# Patient Record
Sex: MALE | Race: WHITE | NOT HISPANIC OR LATINO | Employment: FULL TIME | URBAN - METROPOLITAN AREA
[De-identification: names, ages, dates, MRNs, and addresses within clinical notes are randomized per-mention and may not be internally consistent; named-entity substitution may affect disease eponyms.]

---

## 2017-04-19 ENCOUNTER — HOSPITAL ENCOUNTER (OUTPATIENT)
Dept: PHYSICAL THERAPY | Facility: HOSPITAL | Age: 37
Setting detail: THERAPIES SERIES
Discharge: HOME | End: 2017-04-19

## 2017-04-19 DIAGNOSIS — S93.325S LISFRANC DISLOCATION, LEFT, SEQUELA: Primary | ICD-10-CM

## 2017-04-19 PROCEDURE — 97161 PT EVAL LOW COMPLEX 20 MIN: CPT | Performed by: PHYSICAL THERAPIST

## 2017-04-19 NOTE — PROGRESS NOTES
"    Outpatient Physical Therapy Ortho Initial Evaluation   Jennifer Renee     Patient Name: Matheus Olsen  : 1980  MRN: [id number]  Today's Date: 2017      Visit Date: 2017    There is no problem list on file for this patient.       History reviewed. No pertinent past medical history.     History reviewed. No pertinent surgical history.    Visit Dx:     ICD-10-CM ICD-9-CM   1. Lisfranc dislocation, left, sequela S93.325S 905.6             Patient History       17 1300          History    Chief Complaint Difficulty Walking;Difficulty with daily activities;Pain  -SP      Type of Pain Ankle pain  -SP      Date Current Problem(s) Began 17  -SP      Brief Description of Current Complaint Jumped from 2nd floor building and fracture tibia, fibula and 3 toes on left side he was casted in ER in [place] and a week later he saw Dr. Wright.  Patient has been non weight bearing but has put some weight on left LE.   Patient reports that he does have aching and a \"shock\" type pain.   To return to MD in 2-3 weeks.  He is supposed to be wearing an aircast and weight bearing only to balance.  He has crutches but is not using them all the time.  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 rest  -SP      What clinical tests have you had for this problem? X-ray  -SP      Pain     Pain Location Ankle;Foot  -SP      Pain at Present 0  -SP      Pain at Best 0  -SP      Pain at Worst 7  -SP      Pain Frequency Intermittent  -SP      Pain Description Aching;Shooting  -SP      " What Performance Factors Make the Current Problem(s) WORSE? weight bearing; but can have pain even at rest  -SP      What Performance Factors Make the Current Problem(s) BETTER? rest  -SP      Tolerance Time- Standing 30 min  -SP      Tolerance Time- Walking 30 min  -SP      Is your sleep disturbed? Yes  -SP      Difficulties at work? currently unable to work   -SP      Difficulties with ADL's? increased time and difficulty with weight bearing for ADLs  -SP      Fall Risk Assessment    Any falls in the past year: Yes  -SP      Number of falls reported in the last 12 months 1  -SP      Daily Activities    Primary Language English  -SP      How does patient learn best? Listening  -SP      Teaching needs identified Home Exercise Program;Management of Condition  -SP      Patient is concerned about/has problems with Performing home management (household chores, shopping, care of dependents);Performing job responsibilities/community activities (work, school,;Standing;Walking  -SP      Does patient have problems with the following? None  -SP      Barriers to learning None  -SP      Pt Participated in POC and Goals Yes  -SP      Safety    Are you being hurt, hit, or frightened by anyone at home or in your life? No  -SP      Are you being neglected by a caregiver No  -SP        User Key  (r) = Recorded By, (t) = Taken By, (c) = Cosigned By    Initials Name Provider Type    SP Jasmin Naidu, PT Physical Therapist                PT Ortho       04/19/17 1300    Posture/Observations    Observations Incision healing;Edema;Muscle atrophy  -SP    Posture/Observations Comments medial and lateral ankle incisions and incision across dorsum of foot healing well  -SP    Sensation    Additional Comments patient with reports of numbness throughout left foot that varies in intensity; also exhibits hypersensitivity along medial ankle and dorsal ankle incisions  -SP    General LE Assessment    ROM RLE ROM was WFL   left hip and  knee AROM wfl  -SP    Left Ankle    Dorsiflexion AROM Deficit 2  -SP    Plantarflexion AROM Deficit 40  -SP    Inversion AROM Deficit 15  -SP    Eversion AROM Deficit 0  -SP    Left Ankle/Foot    Ankle PF Gross Movement (3-/5) fair minus  -SP    Ankle Dorsiflexion Gross Movement (3-/5) fair minus  -SP    Subtalar Inversion Gross Movement (2+/5) poor plus  -SP    Subtalar Eversion Gross Movement (2+/5) poor plus  -SP    Flexibility    Flexibility Tested? Lower Extremity  -SP    Lower Extremity Flexibility    Gastrocnemius Left:;Moderately limited  -SP    Soleus Left:;Moderately limited  -SP    Ankle Girth    Figure 8- Left 57.0 cm  -SP    Mid Tarsal- Left 25.4 cm  -SP    MTP Joints- Left 24.4 cm  -SP    Ankle Girth Other 1 27.0 cm   bi-malleolar  -SP    Transfers    Transfers, Sit-Stand [place] independent;upper extremity support  -SP    Transfer, Comment patient able to independently transfer S/S/S.  Uses UEs to assist with transfers to stand  -SP    Gait Assessment/Treatment    Gait, [place] Level independent  -SP    Gait, Assistive Device axillary crutches  -SP    Gait, Gait Pattern Analysis 2-point gait  -SP    Gait, Maintain Weight Bearing Status able to maintain weight bearing status   Pt ambulates in clinic with NWB  -SP    Gait, Comment patient reports he is walking in his home without crutches with weight bearing on left LE despite MD orders for NWB (L)  -SP    Stairs Assessment/Treatment    Stairs, Comment Patient reports he is able to to independently ambulate stairs with crutches.  He does not have stairs at home  -SP      User Key  (r) = Recorded By, (t) = Taken By, (c) = Cosigned By    Initials Name Provider Type    SP Jasmin Naidu, PT Physical Therapist                            Therapy Education       04/19/17 1700          Therapy Education    Given HEP;Other (comment)   reviewed NWB status for left LE and importance of compliance for healing  -SP      Program New  -SP      How  Provided Verbal;Written  -SP      Provided to Patient  -SP      Level of Understanding Verbalized  -SP        User Key  (r) = Recorded By, (t) = Taken By, (c) = Cosigned By    Initials Name Provider Type    MAIK Naidu, PT Physical Therapist                PT OP Goals       04/19/17 1300       PT Short Term Goals    STG 1 1.  Patient demonstrates left ankle dorsiflexion to 8 degrees to enable normal gait pattern  -SP     STG 2 2.  Patient exhibits decreased left foot and ankle girth measures by 0.5 cm to decrease pain and improve ankle mobilty  -SP     STG 3 3.  Patient demonstrates compliance with weight bearing status prescribed by his physician  -SP     Long Term Goals    LTG 1 1.  Patient demonstrates left ankle AROM to wfl without complaints of pain at end ranges  -SP     LTG 2 2.  Patient exhibits safe ambulation on level and unlevel surfaces with regard to prescribed weight  bearing status  -SP     LTG 3 3.  Patient demonstrates left ankle strength increased to > or equal to 4/5 to allow safe return to home, work and community ADLs  -SP     LTG 4 4.  Patient independent with HEP  -SP     Time Calculation    PT Goal Re-Cert Due Date 05/19/17  -SP       User Key  (r) = Recorded By, (t) = Taken By, (c) = Cosigned By    Initials Name Provider Type    MAIK Naidu PT Physical Therapist                PT Assessment/Plan       04/19/17 1710 04/19/17 1708    PT Assessment    Functional Limitations  Impaired gait;Limitation in home management;Limitations in community activities;Limitations in functional capacity and performance;Performance in self-care ADL;Performance in work activities  -SP    Impairments  Balance;Edema;Gait;Pain;Muscle strength;Range of motion;Sensation;Joint mobility  -SP    Assessment Comments Patient injured after jumping from second floor resulting in tib/fib and toe fracture.  Patient underwent surgical repair lisfranc approximatly 6-7 weeks ago.  He is to be NWB  on left per orders, however patient reports he has been walking in his home with weight bearing without assisitve device.  Patient presents with decresaed ROM, decreased strength , incisional area hypersensitivity, and impaired gait and functional ability.   -SP     Please refer to paper survey for additional self-reported information Yes  -SP     Rehab Potential Good  -SP     Patient/caregiver participated in establishment of treatment plan and goals Yes  -SP     Patient would benefit from skilled therapy intervention Yes  -SP     PT Plan    PT Frequency 2x/week  -SP     Predicted Duration of Therapy Intervention (days/wks) 4-6 weeks  -SP     Planned CPT's? PT EVAL LOW COMPLEXITY: 31113;PT THER PROC EA 15 MIN: 86470;PT HOT OR COLD PACK TREAT MCARE;PT ELECTRICAL STIM UNATTEND: ;PT GAIT TRAINING EA 15 MIN: 96146  -SP       User Key  (r) = Recorded By, (t) = Taken By, (c) = Cosigned By    Initials Name Provider Type    SP Jasmin Naidu, PT Physical Therapist                Modalities       04/19/17 1300          Ice    Ice Applied Yes  -SP      Location left foot and ankle   -SP      Rx Minutes 15 mins  -SP      Ice Prior to Rx No  -SP      Ice S/P Rx Yes  -SP      ELECTRICAL STIMULATION    Attended/Unattended Unattended  -SP      Stimulation Type IFC  -SP      Location/Electrode Placement/Other left foot and ankle  -SP      Rx Minutes 15 mins  -SP        User Key  (r) = Recorded By, (t) = Taken By, (c) = Cosigned By    Initials Name Provider Type    SP Jasmin Naidu, PT Physical Therapist              Exercises       04/19/17 1300          Exercise 1    Exercise Name 1 Ankle AROM: DF/PF  -SP      Reps 1 30  -SP      Exercise 2    Exercise Name 2 ankle AROM inv/ever  -SP      Reps 2 30  -SP      Exercise 3    Exercise Name 3 ankle alphabet  -SP      Reps 3 1  -SP      Exercise 4    Exercise Name 4 NWB gastroc stretch  -SP      Reps 4 3  -SP      Time (Seconds) 4 20  -SP      Exercise 5     Exercise Name 5 SLR   -SP      Reps 5 30  -SP      Exercise 6    Exercise Name 6 sidelying hip abduction  -SP      Reps 6 30  -SP      Exercise 7    Exercise Name 7 sidelying hip adduction  -SP      Reps 7 30  -SP        User Key  (r) = Recorded By, (t) = Taken By, (c) = Cosigned By    Initials Name Provider Type    MAIK Naidu, PT Physical Therapist           Manual Rx (last 36 hours)      Manual Treatments       04/19/17 1300          Manual Rx 1    Manual Rx 1 Location medial and dorsal aspect incisions on left foot/ankle  -SP      Manual Rx 1 Type scar massage, soft tissue mobilization  -SP        User Key  (r) = Recorded By, (t) = Taken By, (c) = Cosigned By    Initials Name Provider Type    SP Jasmin Naidu, JOEY Physical Therapist                            Outcome Measures       04/19/17 1300          Lower Extremity Functional Index    Any of your usual work, housework or school activities 0  -SP      Your usual hobbies, recreational or sporting activities 0  -SP      Getting into or out of the bath 2  -SP      Walking between rooms 2  -SP      Putting on your shoes or socks 2  -SP      Squatting 0  -SP      Lifting an object, like a bag of groceries from the floor 2  -SP      Performing light activities around your home 1  -SP      Performing heavy activities around your home 0  -SP      Getting into or out of a car 2  -SP      Walking 2 blocks 0  -SP      Walking a mile 0  -SP      Going up or down 10 stairs (about 1 flight of stairs) 1  -SP      Standing for 1 hour 0  -SP      Sitting for 1 hour 1  -SP      Running on even ground 0  -SP      Running on uneven ground 0  -SP      Making sharp turns while running fast 0  -SP      Hopping 2  -SP      Rolling over in bed 3  -SP      Total 18  -SP      Functional Assessment    Outcome Measure Options Lower Extremity Functional Scale (LEFS)  -SP        User Key  (r) = Recorded By, (t) = Taken By, (c) = Cosigned By    Initials Name  Provider Type    SP Jasmin Naidu, PT Physical Therapist            Time Calculation:   Start Time: 1300  Stop Time: 1410  Time Calculation (min): 70 min     Therapy Charges for Today     Code Description Service Date Service Provider Modifiers Qty    05275721890 HC PT EVAL LOW COMPLEXITY 2 4/19/2017 Jasmin Naidu, PT GP 1          PT G-Codes  Outcome Measure Options: Lower Extremity Functional Scale (LEFS)         Jasmin Naidu, PT  4/19/2017

## 2017-04-26 ENCOUNTER — HOSPITAL ENCOUNTER (OUTPATIENT)
Dept: PHYSICAL THERAPY | Facility: HOSPITAL | Age: 37
Setting detail: THERAPIES SERIES
Discharge: HOME | End: 2017-04-26

## 2017-04-26 PROCEDURE — 97110 THERAPEUTIC EXERCISES: CPT | Performed by: PHYSICAL THERAPIST

## 2017-04-26 PROCEDURE — G0283 ELEC STIM OTHER THAN WOUND: HCPCS | Performed by: PHYSICAL THERAPIST

## 2017-04-26 NOTE — PROGRESS NOTES
Outpatient Physical Therapy Ortho Treatment Note  KALIN Medina     Patient Name: Matheus Olsen  : 1980  MRN: [id number]  Today's Date: 2017      Visit Date: 2017    Visit Dx:  No diagnosis found.    There is no problem list on file for this patient.       No past medical history on file.     No past surgical history on file.          PT Ortho       17 1000    Subjective Comments    Subjective Comments Patient reports that he tried to massage dorsum of foot over incision and thinks he might have been too rough and he aggravated it.  -SP      User Key  (r) = Recorded By, (t) = Taken By, (c) = Cosigned By    Initials Name Provider Type    MAIK Naidu, PT Physical Therapist                            PT Assessment/Plan       17 1226       PT Assessment    Assessment Comments Patient tolerates ther-ex and manual techniques without increased complaint of pain  -SP     PT Plan    PT Plan Comments Continue to progress ROM per protocol  -SP       User Key  (r) = Recorded By, (t) = Taken By, (c) = Cosigned By    Initials Name Provider Type    MAIK Naidu, PT Physical Therapist                Modalities       17 1000 17 0900       Moist Heat    MH Applied Yes  -SP (P)  Yes  -KM     Location Left ankle/gastroc  -SP (P)  Left ankle/gastroc  -KM     Rx Minutes 12 mins  -SP (P)  12 mins  -KM     MH Prior to Rx Yes  -SP (P)  Yes  -KM     Ice    Ice Applied Yes  -SP (P)  Yes  -KM     Location left foot and ankle   -SP (P)  left foot and ankle   -KM     Rx Minutes 15 mins  -SP (P)  15 mins  -KM     Ice Prior to Rx No  -SP (P)  No  -KM     Ice S/P Rx Yes  -SP (P)  Yes  -KM     ELECTRICAL STIMULATION    Attended/Unattended Unattended  -SP (P)  Unattended  -KM     Stimulation Type IFC  -SP (P)  IFC  -KM     Location/Electrode Placement/Other left foot and ankle  -SP (P)  left foot and ankle  -KM     Rx Minutes 15 mins  -SP (P)  15 mins  -KM       User Key   (r) = Recorded By, (t) = Taken By, (c) = Cosigned By    Initials Name Provider Type    MAIK Naidu, PT Physical Therapist     Jayshree Clayton, PTA Student PTA Student                Exercises       04/26/17 1000          Subjective Comments    Subjective Comments Patient reports that he tried to massage dorsum of foot over incision and thinks he might have been too rough and he aggravated it.  -SP      Exercise 1    Exercise Name 1 (P)  Ankle AROM: DF/PF  -KM      Reps 1 (P)  Other   40  -KM      Exercise 2    Exercise Name 2 (P)  ankle AROM inv/ever  -KM      Reps 2 (P)  Other   40  -KM      Exercise 3    Exercise Name 3 (P)  ankle alphabet  -KM      Reps 3 (P)  1  -KM      Exercise 4    Exercise Name 4 (P)  NWB gastroc stretch  -KM      Reps 4 (P)  3  -KM      Time (Seconds) 4 (P)  20  -KM      Exercise 5    Exercise Name 5 (P)  SLR   -KM      Reps 5 (P)  Other   40  -KM      Exercise 6    Exercise Name 6 (P)  sidelying hip abduction  -KM      Reps 6 (P)  Other   40  -KM      Exercise 7    Exercise Name 7 (P)  sidelying hip adduction  -KM      Reps 7 (P)  Other   40  -KM        User Key  (r) = Recorded By, (t) = Taken By, (c) = Cosigned By    Initials Name Provider Type    MAIK Naidu, PT Physical Therapist     Jayshree Clayton, PTA Student PTA Student                        Manual Rx (last 36 hours)      Manual Treatments       04/26/17 1100          Manual Rx 1    Manual Rx 1 Location left dorsal foot incision  -SP      Manual Rx 1 Type scar massage, soft tissue massage  -SP      Manual Rx 2    Manual Rx 2 Location left talocrural joint  -SP      Manual Rx 2 Type calcaneal distraction/ inversion and eversion  -SP        User Key  (r) = Recorded By, (t) = Taken By, (c) = Cosigned By    Initials Name Provider Type    MAIK Naidu, PT Physical Therapist                        Time Calculation:   Start Time: 1000  Stop Time: 1110  Time Calculation (min): 70 min    Therapy  Charges for Today     Code Description Service Date Service Provider Modifiers Qty    46481935013 HC PT ELECTRICAL STIM UNATTENDED 4/26/2017 Jasmin Naidu, PT  1    22369159917 HC PT HOT OR COLD PACK TREAT MCARE 4/26/2017 Jasmin Naidu, PT GP 1    54294493781 HC PT THER PROC EA 15 MIN 4/26/2017 Jasmin Naidu, PT GP 1                    Jasmin Naidu, PT  4/26/2017

## 2017-05-02 ENCOUNTER — HOSPITAL ENCOUNTER (OUTPATIENT)
Dept: PHYSICAL THERAPY | Facility: HOSPITAL | Age: 37
Setting detail: THERAPIES SERIES
Discharge: HOME | End: 2017-05-02

## 2017-05-02 PROCEDURE — G0283 ELEC STIM OTHER THAN WOUND: HCPCS | Performed by: PHYSICAL THERAPIST

## 2017-05-02 PROCEDURE — 97140 MANUAL THERAPY 1/> REGIONS: CPT | Performed by: PHYSICAL THERAPIST

## 2017-05-02 PROCEDURE — 97110 THERAPEUTIC EXERCISES: CPT | Performed by: PHYSICAL THERAPIST

## 2017-09-26 ENCOUNTER — DOCUMENTATION (OUTPATIENT)
Dept: PHYSICAL THERAPY | Facility: HOSPITAL | Age: 37
End: 2017-09-26

## 2017-09-26 NOTE — THERAPY DISCHARGE NOTE
Outpatient Physical Therapy Rehab Program Treatment/Discharge       Patient Name: Matheus Olsen  : 1980  MRN: [id number]  Today's Date: 2017      Visit Date: 2017    Visit Dx:  No diagnosis found.    There is no problem list on file for this patient.       No past medical history on file.     No past surgical history on file.                                                       PT OP Goals       17 0823       PT Short Term Goals    STG 1 1.  Patient demonstrates left ankle dorsiflexion to 8 degrees to enable normal gait pattern  -SP     STG 1 Progress Not Met  -SP     STG 2 2.  Patient exhibits decreased left foot and ankle girth measures by 0.5 cm to decrease pain and improve ankle mobilty  -SP     STG 2 Progress Not Met  -SP     STG 3 3.  Patient demonstrates compliance with weight bearing status prescribed by his physician  -SP     STG 3 Progress Not Met  -SP     Long Term Goals    LTG 1 1.  Patient demonstrates left ankle AROM to wfl without complaints of pain at end ranges  -SP     LTG 1 Progress Not Met  -SP     LTG 2 2.  Patient exhibits safe ambulation on level and unlevel surfaces with regard to prescribed weight  bearing status  -SP     LTG 2 Progress Not Met  -SP     LTG 3 3.  Patient demonstrates left ankle strength increased to > or equal to 4/5 to allow safe return to home, work and community ADLs  -SP     LTG 3 Progress Not Met  -SP     LTG 4 4.  Patient independent with HEP  -SP     LTG 4 Progress Not Met  -SP       User Key  (r) = Recorded By, (t) = Taken By, (c) = Cosigned By    Initials Name Provider Type    MAIK Naidu, PT Physical Therapist                    Time Calculation:                   OP PT Discharge Summary  Date of Discharge: 17  Reason for Discharge: Lack of progress, other (comment) (patient failed to show for scheduled visit)  Outcomes Achieved: Unable to make functional progress toward goals at this time  Discharge  Destination: Unknown      Jasmin Naidu, PT  9/26/2017

## 2019-05-17 ENCOUNTER — WALK IN (OUTPATIENT)
Dept: URGENT CARE | Age: 39
End: 2019-05-17

## 2019-05-17 VITALS
SYSTOLIC BLOOD PRESSURE: 128 MMHG | WEIGHT: 164 LBS | HEART RATE: 79 BPM | RESPIRATION RATE: 20 BRPM | TEMPERATURE: 98.3 F | OXYGEN SATURATION: 98 % | DIASTOLIC BLOOD PRESSURE: 78 MMHG

## 2019-05-17 DIAGNOSIS — Z71.1 CONCERN ABOUT STD IN MALE WITHOUT DIAGNOSIS: Primary | ICD-10-CM

## 2019-05-17 DIAGNOSIS — Z20.2 POSSIBLE EXPOSURE TO STD: ICD-10-CM

## 2019-05-17 PROCEDURE — 99203 OFFICE O/P NEW LOW 30 MIN: CPT | Performed by: PHYSICIAN ASSISTANT

## 2019-05-17 PROCEDURE — 87591 N.GONORRHOEAE DNA AMP PROB: CPT | Performed by: INTERNAL MEDICINE

## 2019-05-17 PROCEDURE — 86780 TREPONEMA PALLIDUM: CPT | Performed by: INTERNAL MEDICINE

## 2019-05-17 PROCEDURE — 87389 HIV-1 AG W/HIV-1&-2 AB AG IA: CPT | Performed by: INTERNAL MEDICINE

## 2019-05-17 PROCEDURE — 87491 CHLMYD TRACH DNA AMP PROBE: CPT | Performed by: INTERNAL MEDICINE

## 2019-05-17 ASSESSMENT — ENCOUNTER SYMPTOMS
FEVER: 0
ABDOMINAL PAIN: 0
SHORTNESS OF BREATH: 0
VOMITING: 0
CHILLS: 0
NAUSEA: 0

## 2019-05-18 ENCOUNTER — TELEPHONE (OUTPATIENT)
Dept: URGENT CARE | Age: 39
End: 2019-05-18

## 2019-05-18 LAB
HIV 1+2 AB+HIV1 P24 AG SERPL QL IA: NONREACTIVE
T PALLIDUM IGG SER QL: NONREACTIVE

## 2019-05-20 ENCOUNTER — TELEPHONE (OUTPATIENT)
Dept: URGENT CARE | Age: 39
End: 2019-05-20

## 2019-05-20 LAB
C TRACH RRNA SPEC QL NAA+PROBE: NEGATIVE
N GONORRHOEA RRNA SPEC QL NAA+PROBE: NEGATIVE
SPECIMEN SOURCE: NORMAL